# Patient Record
Sex: MALE | Race: WHITE | NOT HISPANIC OR LATINO | Employment: OTHER | URBAN - METROPOLITAN AREA
[De-identification: names, ages, dates, MRNs, and addresses within clinical notes are randomized per-mention and may not be internally consistent; named-entity substitution may affect disease eponyms.]

---

## 2020-03-05 ENCOUNTER — OFFICE VISIT (OUTPATIENT)
Dept: OTOLARYNGOLOGY | Facility: CLINIC | Age: 85
End: 2020-03-05
Payer: COMMERCIAL

## 2020-03-05 ENCOUNTER — TELEPHONE (OUTPATIENT)
Dept: OTOLARYNGOLOGY | Facility: CLINIC | Age: 85
End: 2020-03-05

## 2020-03-05 ENCOUNTER — CLINICAL SUPPORT (OUTPATIENT)
Dept: AUDIOLOGY | Facility: CLINIC | Age: 85
End: 2020-03-05
Payer: COMMERCIAL

## 2020-03-05 VITALS — WEIGHT: 201.5 LBS

## 2020-03-05 DIAGNOSIS — H90.3 SENSORINEURAL HEARING LOSS (SNHL) OF BOTH EARS: ICD-10-CM

## 2020-03-05 DIAGNOSIS — H61.22 IMPACTED CERUMEN OF LEFT EAR: Primary | ICD-10-CM

## 2020-03-05 DIAGNOSIS — H90.5 HEARING LOSS, SENSORINEURAL, COMBINED TYPES: Primary | ICD-10-CM

## 2020-03-05 DIAGNOSIS — H93.292 IMPAIRED AUDITORY DISCRIMINATION, LEFT: ICD-10-CM

## 2020-03-05 PROCEDURE — 99999 PR PBB SHADOW E&M-NEW PATIENT-LVL I: CPT | Mod: PBBFAC,,, | Performed by: AUDIOLOGIST

## 2020-03-05 PROCEDURE — 69210 REMOVE IMPACTED EAR WAX UNI: CPT | Mod: PBBFAC | Performed by: PHYSICIAN ASSISTANT

## 2020-03-05 PROCEDURE — 99203 OFFICE O/P NEW LOW 30 MIN: CPT | Mod: 25,S$PBB,, | Performed by: PHYSICIAN ASSISTANT

## 2020-03-05 PROCEDURE — 99999 PR PBB SHADOW E&M-EST. PATIENT-LVL I: ICD-10-PCS | Mod: PBBFAC,,, | Performed by: PHYSICIAN ASSISTANT

## 2020-03-05 PROCEDURE — 99201 *HC E&M-NEW PATIENT-LVL I: CPT | Mod: PBBFAC,27 | Performed by: AUDIOLOGIST

## 2020-03-05 PROCEDURE — 69210 PR REMOVAL IMPACTED CERUMEN REQUIRING INSTRUMENTATION, UNILATERAL: ICD-10-PCS | Mod: S$PBB,,, | Performed by: PHYSICIAN ASSISTANT

## 2020-03-05 PROCEDURE — 99999 PR PBB SHADOW E&M-EST. PATIENT-LVL I: CPT | Mod: PBBFAC,,, | Performed by: PHYSICIAN ASSISTANT

## 2020-03-05 PROCEDURE — 69210 REMOVE IMPACTED EAR WAX UNI: CPT | Mod: S$PBB,,, | Performed by: PHYSICIAN ASSISTANT

## 2020-03-05 PROCEDURE — 99211 OFF/OP EST MAY X REQ PHY/QHP: CPT | Mod: PBBFAC | Performed by: PHYSICIAN ASSISTANT

## 2020-03-05 PROCEDURE — 99203 PR OFFICE/OUTPT VISIT, NEW, LEVL III, 30-44 MIN: ICD-10-PCS | Mod: 25,S$PBB,, | Performed by: PHYSICIAN ASSISTANT

## 2020-03-05 PROCEDURE — 92567 TYMPANOMETRY: CPT | Mod: PBBFAC | Performed by: AUDIOLOGIST

## 2020-03-05 PROCEDURE — 92557 COMPREHENSIVE HEARING TEST: CPT | Mod: PBBFAC | Performed by: AUDIOLOGIST

## 2020-03-05 PROCEDURE — 99999 PR PBB SHADOW E&M-NEW PATIENT-LVL I: ICD-10-PCS | Mod: PBBFAC,,, | Performed by: AUDIOLOGIST

## 2020-03-05 NOTE — LETTER
March 5, 2020      [facility]  [address] [place] [address]           [facility]  [address]  [place] [address]  Phone: [phone]  Fax: [phone]          Patient: Chinmay Kirby   MR Number: [id number]   YOB: 1933   Date of Visit: 3/5/2020       Dear [facility]:    Thank you for referring Chinmay Kirby to me for evaluation. Attached you will find relevant portions of my assessment and plan of care.    If you have questions, please do not hesitate to call me. I look forward to following Chinmay Kirby along with you.    Sincerely,    Barbara Kyle PA-C    Enclosure  CC:  No Recipients    If you would like to receive this communication electronically, please contact [email or web address] or [phone] to request more information on [employer] access.    For providers and/or their staff who would like to refer a patient to [facility], please contact us through our one-stop-shop provider referral line, Farnaz Ordonez, at [phone].    If you feel you have received this communication in error or would no longer like to receive these types of communications, please e-mail [email or web address]

## 2020-03-05 NOTE — PROGRESS NOTES
Chinmay Kirby was seen 03/05/2020 for an audiological evaluation.  Patient has a history and complaint of bilateral gradual hearing loss. His last audiogram was 2 years ago. He reports not hearing as well out of his left ear for the past year. He has a history of noise exposure. He denies tinnitus and dizziness. [facility] is a  and [facility]     Results reveal a mild-to-moderately severe sensorineural hearing loss 250-8000 Hz for the right ear, and  mild-to-moderately severe sensorineural hearing loss 250-8000 Hz for the left ear.   Speech Reception Thresholds were  45 dBHL for the right ear and 50 dBHL for the left ear.   Word recognition scores were good for the right ear and poor for the left ear.   Tympanograms were Type C for the right ear and Type A, normal for the left ear.    Patient was counseled on the above findings.    Recommendations include:    1.  ENT followup  2.  [facility], BICROS discussed, copy of audiogram given  3.  Wear hearing protective devices around loud noise  4.  Annual audiograms

## 2020-03-05 NOTE — PROGRESS NOTES
Referring Provider:    [facility]  [address] [place] [address]  Subjective:   Patient: Chinmay Kirby [id number], :1933   Visit date:3/5/2020 9:38 AM    Chief Complaint:  Hearing Loss (both ears )    HPI:  Chinmay is a 86 y.o. male who I was asked to see in consultation for evaluation of the following issue(s):    Hearing Loss:    He describes a equally, bilateral sided hearing loss starting many years ago and has been gradually worsening.  Wears a HA on his right side, [facility]. Is interested in HA for left side.     The patient reports the following risk factors for hearing loss (Negative unless checked off):   [] Familial deafness   [] Ototoxic medication exposure  [] Acoustic trauma  []  Occupational exposure  [] Head injury or trauma  [] Otologic infection  [] History of meningitis  [] Ear surgery (other than pediatric tympanostomy tubes)  [] Metabolic disease      Severity: mild  Quality: muffled  Modifying factors:  None  Associated symptoms include   [] Vertigo  [] Tinnitus  [] Otalgia  [] Drainage or pain   Previous treatments include hearing aid.    No associated otalgia/ dizziness/ or tinnitus    Review of Systems:  Negative unless checked off.  Gen:  []fever   []fatigue  HENT:  []nosebleeds  []dental problem   Eyes:  []photophobia  []visual disturbance  Resp:  []chest tightness []wheezing  Card:  []chest pain  []leg swelling  GI:  []abdominal pain []blood in stool  :  []dysuria  []hematuria  Musc:  []joint swelling  []gait problem  Skin:  []color change  []pallor  Neuro:  []seizures  []numbness  Hem:  []bruise/bleed easily  Psych:  []hallucinations  []behavioral problems  Allergy/Imm: has no allergies on file.    His meds, allergies, medical, surgical, social & family histories were reviewed & updated:  -     He currently has no medications in their medication list.  -     He  has no past medical history on file.   -     He does not have a  problem list on file.   -     He  has no past surgical history on file.  -     He  reports that he has never smoked. He has never used smokeless tobacco.  -     His family history is not on file.  -     He has no allergies on file.    Objective:     Physical Exam:  Vitals:  Wt 91.4 kg (201 lb 8 oz)   General appearance:  Well developed, well nourished    Eyes:  Extraocular motions intact, PERRL    Communication:  no hoarseness, no dysphonia    Ears:  Otoscopy of external auditory canals and tympanic membranes was normal, clinical speech reception thresholds grossly intact, no mass/lesion of auricle.  Nose:  No masses/lesions of external nose, nasal mucosa, septum, and turbinates were within normal limits.  Mouth:  No mass/lesion of lips, teeth, gums, hard/soft palate, tongue, tonsils, or oropharynx.    Cardiovascular:  No pedal edema; Radial Pulses +2     Neck & Lymphatics:  No cervical lymphadenopathy, no neck mass/crepitus/ asymmetry, trachea is midline, no thyroid enlargement/tenderness/mass.    Psych: Oriented x3,  Alert with normal mood and affect.     Respiration/Chest:  Symmetric expansion during respiration, normal respiratory effort.    Skin:  Warm and intact. No ulcerations of face, scalp, neck.      AUDIOGRAM:       Assessment & Plan:   Chinmay was seen today for hearing loss.    Diagnoses and all orders for this visit:    Impacted cerumen of left ear    Sensorineural hearing loss (SNHL) of both ears        Chinmay presents with new problem(s) today. We discussed that hearing loss may be multifactorial requiring additional testing to determine various causes of hearing loss and the degree to which they are contributing to symptoms. The prognosis and progression of hearing loss is uncertain and significantly dependent on the etiology.     -HEARING LOSS-  I spent a considerable amount of time educating the patient on hearing and hearing loss.  We discussed the basic characteristics of conductive hearing loss  versus sensorineural hearing loss and the significant differences in treatment options between the two categories.      We discussed that in cases of conductive hearing loss, this suggests a mechanical disorder that sometimes can be improved with medications &/or surgery.  It may occur secondary to external ear pathology (atresia, otitis externa, etc), tympanic membrane disorders (large perforations, immobility due to scarring or eustachian tube dysfunction), and middle ear disorders (effusions, ossicular disorders).    Sensorineural hearing loss is the expected hearing loss pattern with aging, but some disorders such as Meniere's may accelerate this process.  Additionally, amplification with hearing aids is generally the best option for hearing rehabilitation, except where the hearing loss is profound.  We discussed that this generally does not represent a dangerous condition, but in cases where there is a large discrepancy between the two ears in terms of nerve function, more investigation is often necessary due to the possiblity of vestibular schwannomas or meningiomas at the cerebellopontine angle.  The definitive test for this is an MRI with gadolinium.  However, these masses are usually very slow growing (1-2mm/year), so patients may elect to repeat an audiogram in about 6 months or obtain an ABR so long as they understand that this may result in a delay in diagnosis (although very unlikely that this would have a significant clinical impact on their outcome due to the slow growing nature of these masses) with the understanding that if ABR is abnormal or asymmetry increases, an MRI would then be required.    Chinmay  presents with what appears to be sensorineural hearing loss.  Based on this, my recommendation is annual audiograms and [facility].       Thank you for allowing me to participate in the care of Chinmay.      Barbara Kyle PA-C  [facility]   [facility]  [address]  [place] [address]  P: [phone]  F: [phone]    Patient: Chinmay Kirby [id number], :1933  Procedure date:3/5/2020  Patient's medications, allergies, past medical, surgical, social and family histories were reviewed and updated as appropriate.  Chief Complaint:  Hearing Loss (both ears )    HPI:  Chinmay is a 86 y.o. male with the history of present illness as discussed in the clinic note from today.  During this unrelated patient encounter I observed impacted cerumen.  The otoscopic examination of the tympanic membrane was not possible due to copious cerumen impaction.      Procedure: The patient was in agreement with the examination and debridement of the ears. Removal of the cerumen required a high level of expertise and use of an operating microscope and multiple micro-instruments.     With the patient in the supine position, we used the operating microscope to examine both ears with the appropriate sized ear speculum.  A variety of sterile, micro-instruments were utilized to remove the cerumen atraumatically.  I performed the procedure which required a significant amount of time and effort. The tympanic membrane was then well visualized.  The patient tolerated the procedure well and there were no complications.    Findings:   Right ear had no wax, the EAC was normal, and the tympanic membrane was intact with no evidence of middle ear fluid.    Left ear had significant wax, the EAC was normal, and the tympanic membrane was intact with no evidence of middle ear fluid.

## 2020-03-05 NOTE — TELEPHONE ENCOUNTER
----- Message from Yudelka Christy sent at 3/5/2020 11:01 AM CST -----  Contact: [facility]  Caller needs calll back to consult with nurse matthew patient appointment [phone]

## 2020-03-05 NOTE — TELEPHONE ENCOUNTER
Attempted to return call to [facility] after being transferred phone just continue to ring with no answer.